# Patient Record
Sex: MALE | Race: WHITE | Employment: FULL TIME | URBAN - NONMETROPOLITAN AREA
[De-identification: names, ages, dates, MRNs, and addresses within clinical notes are randomized per-mention and may not be internally consistent; named-entity substitution may affect disease eponyms.]

---

## 2017-04-12 ENCOUNTER — OFFICE VISIT (OUTPATIENT)
Age: 52
End: 2017-04-12

## 2017-04-12 VITALS
RESPIRATION RATE: 16 BRPM | OXYGEN SATURATION: 98 % | TEMPERATURE: 98.5 F | HEART RATE: 100 BPM | WEIGHT: 206 LBS | DIASTOLIC BLOOD PRESSURE: 70 MMHG | BODY MASS INDEX: 27.9 KG/M2 | SYSTOLIC BLOOD PRESSURE: 118 MMHG | HEIGHT: 72 IN

## 2017-04-12 DIAGNOSIS — K35.32 PERFORATED APPENDICITIS: Primary | ICD-10-CM

## 2017-04-12 PROCEDURE — 99213 OFFICE O/P EST LOW 20 MIN: CPT | Performed by: SURGERY

## 2017-04-12 PROCEDURE — G8419 CALC BMI OUT NRM PARAM NOF/U: HCPCS | Performed by: SURGERY

## 2017-04-12 PROCEDURE — 1111F DSCHRG MED/CURRENT MED MERGE: CPT | Performed by: SURGERY

## 2017-04-12 PROCEDURE — 3017F COLORECTAL CA SCREEN DOC REV: CPT | Performed by: SURGERY

## 2017-04-12 PROCEDURE — G8427 DOCREV CUR MEDS BY ELIG CLIN: HCPCS | Performed by: SURGERY

## 2017-04-12 PROCEDURE — 1036F TOBACCO NON-USER: CPT | Performed by: SURGERY

## 2017-04-12 ASSESSMENT — ENCOUNTER SYMPTOMS
ABDOMINAL PAIN: 0
COUGH: 0
CHEST TIGHTNESS: 0
FACIAL SWELLING: 0
WHEEZING: 0
VOMITING: 0
BLOOD IN STOOL: 0
CONSTIPATION: 0
RECTAL PAIN: 0
ANAL BLEEDING: 0
BACK PAIN: 0
RHINORRHEA: 0
SORE THROAT: 0
COLOR CHANGE: 0
EYE PAIN: 0
EYE REDNESS: 0
CHOKING: 0
NAUSEA: 0
STRIDOR: 0
SHORTNESS OF BREATH: 0
TROUBLE SWALLOWING: 0
APNEA: 0
SINUS PRESSURE: 0
VOICE CHANGE: 0
ABDOMINAL DISTENTION: 0
DIARRHEA: 1
PHOTOPHOBIA: 0
EYE ITCHING: 0
EYE DISCHARGE: 0

## 2017-04-26 ENCOUNTER — TELEPHONE (OUTPATIENT)
Age: 52
End: 2017-04-26

## 2017-04-26 ENCOUNTER — OFFICE VISIT (OUTPATIENT)
Age: 52
End: 2017-04-26

## 2017-04-26 VITALS
RESPIRATION RATE: 16 BRPM | HEART RATE: 84 BPM | BODY MASS INDEX: 28.31 KG/M2 | HEIGHT: 72 IN | TEMPERATURE: 98.6 F | DIASTOLIC BLOOD PRESSURE: 76 MMHG | SYSTOLIC BLOOD PRESSURE: 130 MMHG | WEIGHT: 209 LBS

## 2017-04-26 DIAGNOSIS — K35.32 PERFORATED APPENDICITIS: Primary | ICD-10-CM

## 2017-04-26 PROCEDURE — 3017F COLORECTAL CA SCREEN DOC REV: CPT | Performed by: SURGERY

## 2017-04-26 PROCEDURE — G8419 CALC BMI OUT NRM PARAM NOF/U: HCPCS | Performed by: SURGERY

## 2017-04-26 PROCEDURE — 1111F DSCHRG MED/CURRENT MED MERGE: CPT | Performed by: SURGERY

## 2017-04-26 PROCEDURE — 99213 OFFICE O/P EST LOW 20 MIN: CPT | Performed by: SURGERY

## 2017-04-26 PROCEDURE — 1036F TOBACCO NON-USER: CPT | Performed by: SURGERY

## 2017-04-26 PROCEDURE — G8427 DOCREV CUR MEDS BY ELIG CLIN: HCPCS | Performed by: SURGERY

## 2017-04-26 ASSESSMENT — ENCOUNTER SYMPTOMS
TROUBLE SWALLOWING: 0
EYE PAIN: 0
EYE DISCHARGE: 0
EYE REDNESS: 0
NAUSEA: 0
VOICE CHANGE: 0
ABDOMINAL DISTENTION: 0
BACK PAIN: 0
SHORTNESS OF BREATH: 0
CHOKING: 0
ANAL BLEEDING: 0
EYE ITCHING: 0
STRIDOR: 0
ABDOMINAL PAIN: 0
VOMITING: 0
CONSTIPATION: 0
CHEST TIGHTNESS: 0
BLOOD IN STOOL: 0
COUGH: 0
SINUS PRESSURE: 0
SORE THROAT: 0
APNEA: 0
PHOTOPHOBIA: 0
FACIAL SWELLING: 0
COLOR CHANGE: 0
RECTAL PAIN: 0
DIARRHEA: 0
WHEEZING: 0
RHINORRHEA: 0

## 2017-05-03 ENCOUNTER — TELEPHONE (OUTPATIENT)
Age: 52
End: 2017-05-03

## 2017-05-23 ENCOUNTER — TELEPHONE (OUTPATIENT)
Age: 52
End: 2017-05-23

## 2017-05-30 PROBLEM — R07.9 CHEST PAIN IN ADULT: Status: ACTIVE | Noted: 2017-05-30

## 2017-05-30 PROBLEM — R94.39 ABNORMAL NUCLEAR STRESS TEST: Status: ACTIVE | Noted: 2017-05-30

## 2017-06-05 ENCOUNTER — TELEPHONE (OUTPATIENT)
Age: 52
End: 2017-06-05

## 2017-06-14 ENCOUNTER — TELEPHONE (OUTPATIENT)
Age: 52
End: 2017-06-14

## 2017-06-28 ENCOUNTER — OFFICE VISIT (OUTPATIENT)
Age: 52
End: 2017-06-28

## 2017-06-28 VITALS
OXYGEN SATURATION: 98 % | WEIGHT: 208.9 LBS | SYSTOLIC BLOOD PRESSURE: 128 MMHG | DIASTOLIC BLOOD PRESSURE: 78 MMHG | RESPIRATION RATE: 18 BRPM | HEIGHT: 72 IN | HEART RATE: 98 BPM | TEMPERATURE: 98.2 F | BODY MASS INDEX: 28.3 KG/M2

## 2017-06-28 DIAGNOSIS — Z90.49 S/P LAPAROSCOPIC APPENDECTOMY: Primary | ICD-10-CM

## 2017-06-28 PROCEDURE — 99024 POSTOP FOLLOW-UP VISIT: CPT | Performed by: NURSE PRACTITIONER

## 2017-06-28 ASSESSMENT — ENCOUNTER SYMPTOMS
SORE THROAT: 0
RHINORRHEA: 0
ABDOMINAL DISTENTION: 0
CHEST TIGHTNESS: 0
WHEEZING: 0
NAUSEA: 0
COLOR CHANGE: 0
CHOKING: 0
SINUS PRESSURE: 0
DIARRHEA: 0
CONSTIPATION: 0
STRIDOR: 0
EYE REDNESS: 0
BLOOD IN STOOL: 0
PHOTOPHOBIA: 0
ANAL BLEEDING: 0
COUGH: 0
VOMITING: 0
EYE PAIN: 0
TROUBLE SWALLOWING: 0
RECTAL PAIN: 0
BACK PAIN: 0
FACIAL SWELLING: 0
APNEA: 0
EYE ITCHING: 0
EYE DISCHARGE: 0
VOICE CHANGE: 0
SHORTNESS OF BREATH: 0

## 2019-01-22 ENCOUNTER — HOSPITAL ENCOUNTER (OUTPATIENT)
Age: 54
Discharge: HOME | End: 2019-01-22
Payer: COMMERCIAL

## 2019-01-22 LAB
ALBUMIN SERPL-MCNC: 4.8 G/DL (ref 3.5–5.1)
ALP BLD-CCNC: 54 U/L (ref 38–126)
ALT SERPL-CCNC: 25 U/L (ref 11–66)
ANION GAP SERPL CALCULATED.3IONS-SCNC: 11 MEQ/L (ref 8–16)
AST SERPL-CCNC: 19 U/L (ref 5–40)
BILIRUB SERPL-MCNC: 0.4 MG/DL (ref 0.3–1.2)
BILIRUBIN DIRECT: < 0.2 MG/DL (ref 0–0.3)
BUN BLDV-MCNC: 20 MG/DL (ref 7–22)
CALCIUM SERPL-MCNC: 9.2 MG/DL (ref 8.5–10.5)
CHLORIDE BLD-SCNC: 104 MEQ/L (ref 98–111)
CHOLESTEROL, TOTAL: 157 MG/DL (ref 100–199)
CO2: 26 MEQ/L (ref 23–33)
CREAT SERPL-MCNC: 0.9 MG/DL (ref 0.4–1.2)
GFR SERPL CREATININE-BSD FRML MDRD: 88 ML/MIN/1.73M2
GLUCOSE BLD-MCNC: 99 MG/DL (ref 70–108)
HDLC SERPL-MCNC: 42 MG/DL
LDL CHOLESTEROL CALCULATED: 77 MG/DL
POTASSIUM SERPL-SCNC: 4 MEQ/L (ref 3.5–5.2)
SODIUM BLD-SCNC: 141 MEQ/L (ref 135–145)
TOTAL PROTEIN: 7.2 G/DL (ref 6.1–8)
TRIGL SERPL-MCNC: 191 MG/DL (ref 0–199)

## 2019-01-22 PROCEDURE — 80053 COMPREHEN METABOLIC PANEL: CPT

## 2019-01-22 PROCEDURE — 80061 LIPID PANEL: CPT

## 2019-01-22 PROCEDURE — 36415 COLL VENOUS BLD VENIPUNCTURE: CPT

## 2019-01-22 PROCEDURE — 82248 BILIRUBIN DIRECT: CPT

## 2020-09-21 ENCOUNTER — HOSPITAL ENCOUNTER (OUTPATIENT)
Age: 55
Discharge: HOME | End: 2020-09-21
Payer: COMMERCIAL

## 2020-09-21 LAB
ALBUMIN SERPL-MCNC: 4.4 G/DL (ref 3.5–5.1)
ALP BLD-CCNC: 45 U/L (ref 38–126)
ALT SERPL-CCNC: 20 U/L (ref 11–66)
ANION GAP SERPL CALCULATED.3IONS-SCNC: 7 MEQ/L (ref 8–16)
AST SERPL-CCNC: 18 U/L (ref 5–40)
BILIRUB SERPL-MCNC: 0.5 MG/DL (ref 0.3–1.2)
BILIRUBIN DIRECT: < 0.2 MG/DL (ref 0–0.3)
BUN BLDV-MCNC: 23 MG/DL (ref 7–22)
CALCIUM SERPL-MCNC: 9.2 MG/DL (ref 8.5–10.5)
CHLORIDE BLD-SCNC: 106 MEQ/L (ref 98–111)
CHOLESTEROL, TOTAL: 183 MG/DL (ref 100–199)
CO2: 25 MEQ/L (ref 23–33)
CREAT SERPL-MCNC: 1 MG/DL (ref 0.4–1.2)
GFR SERPL CREATININE-BSD FRML MDRD: 78 ML/MIN/1.73M2
GLUCOSE BLD-MCNC: 111 MG/DL (ref 70–108)
HDLC SERPL-MCNC: 37 MG/DL
LDL CHOLESTEROL CALCULATED: 106 MG/DL
POTASSIUM SERPL-SCNC: 4.4 MEQ/L (ref 3.5–5.2)
SODIUM BLD-SCNC: 138 MEQ/L (ref 135–145)
TOTAL PROTEIN: 6.9 G/DL (ref 6.1–8)
TRIGL SERPL-MCNC: 202 MG/DL (ref 0–199)

## 2020-09-21 PROCEDURE — 80053 COMPREHEN METABOLIC PANEL: CPT

## 2020-09-21 PROCEDURE — 82248 BILIRUBIN DIRECT: CPT

## 2020-09-21 PROCEDURE — 80061 LIPID PANEL: CPT

## 2020-09-21 PROCEDURE — 36415 COLL VENOUS BLD VENIPUNCTURE: CPT

## 2021-09-28 LAB
ALBUMIN SERPL-MCNC: 4.5 GM/DL (ref 3.5–5)
ALP BLD-CCNC: 38 IU/L (ref 41–137)
ALT SERPL-CCNC: 19 IU/L (ref 10–40)
ANION GAP SERPL CALCULATED.3IONS-SCNC: 6 MMOL/L (ref 4–12)
AST SERPL-CCNC: 15 IU/L (ref 15–41)
BILIRUB SERPL-MCNC: 0.5 MG/DL (ref 0.2–1)
BILIRUBIN DIRECT: 0.1 MG/DL (ref 0.1–0.2)
BUN BLDV-MCNC: 19 MG/DL (ref 7–20)
CALCIUM SERPL-MCNC: 8.8 MG/DL (ref 8.8–10.5)
CHLORIDE BLD-SCNC: 108 MEQ/L (ref 101–111)
CHOLESTEROL/HDL RELATIVE RISK: 3.8 (ref 4–5)
CHOLESTEROL: 180 MG/DL
CO2: 23 MEQ/L (ref 21–32)
CREAT SERPL-MCNC: 0.99 MG/DL (ref 0.6–1.3)
CREATININE CLEARANCE: >60
DIRECT-LDL / HDL RISK: 2.4
GLUCOSE: 80 MG/DL (ref 70–110)
HCT VFR BLD CALC: 43.9 % (ref 40–49)
HDLC SERPL-MCNC: 47 MG/DL
HEMOGLOBIN: 15 GM/DL (ref 13.5–16.5)
LDL CHOLESTEROL DIRECT: 116 MG/DL
MCH RBC QN AUTO: 30.5 PG (ref 27.5–33)
MCHC RBC AUTO-ENTMCNC: 34.2 GM/DL (ref 33–36)
MCV RBC AUTO: 89.2 CU MIC (ref 80–97)
PDW BLD-RTO: 13.2 % (ref 12–16)
PLATELET # BLD: 208 TH/CMM (ref 150–400)
POTASSIUM SERPL-SCNC: 4 MEQ/L (ref 3.6–5)
RBC # BLD: 4.92 MIL/CMM (ref 4.5–6)
SODIUM BLD-SCNC: 137 MEQ/L (ref 135–145)
TOTAL PROTEIN: 6.6 G/DL (ref 6.2–8)
TRIGL SERPL-MCNC: 157 MG/DL
VLDLC SERPL CALC-MCNC: 31 MG/DL
WBC # BLD: 8 TH/CMM (ref 4.4–10.5)

## 2022-09-28 ENCOUNTER — HOSPITAL ENCOUNTER (OUTPATIENT)
Age: 57
Discharge: HOME | End: 2022-09-28
Payer: COMMERCIAL

## 2022-09-28 LAB
ALBUMIN SERPL-MCNC: 4.5 G/DL (ref 3.5–5.1)
ALP BLD-CCNC: 50 U/L (ref 38–126)
ALT SERPL-CCNC: 20 U/L (ref 11–66)
ANION GAP SERPL CALCULATED.3IONS-SCNC: 13 MEQ/L (ref 8–16)
AST SERPL-CCNC: 19 U/L (ref 5–40)
BILIRUB SERPL-MCNC: 0.7 MG/DL (ref 0.3–1.2)
BILIRUBIN DIRECT: < 0.2 MG/DL (ref 0–0.3)
BUN BLDV-MCNC: 20 MG/DL (ref 7–22)
CALCIUM SERPL-MCNC: 9.4 MG/DL (ref 8.5–10.5)
CHLORIDE BLD-SCNC: 106 MEQ/L (ref 98–111)
CHOLESTEROL, TOTAL: 183 MG/DL (ref 100–199)
CO2: 19 MEQ/L (ref 23–33)
CREAT SERPL-MCNC: 0.9 MG/DL (ref 0.4–1.2)
ERYTHROCYTE [DISTWIDTH] IN BLOOD BY AUTOMATED COUNT: 12.8 % (ref 11.5–14.5)
ERYTHROCYTE [DISTWIDTH] IN BLOOD BY AUTOMATED COUNT: 41.1 FL (ref 35–45)
GFR SERPL CREATININE-BSD FRML MDRD: 87 ML/MIN/1.73M2
GLUCOSE BLD-MCNC: 102 MG/DL (ref 70–108)
HCT VFR BLD CALC: 46.8 % (ref 42–52)
HDLC SERPL-MCNC: 43 MG/DL
HEMOGLOBIN: 15.7 GM/DL (ref 14–18)
LDL CHOLESTEROL CALCULATED: 101 MG/DL
MCH RBC QN AUTO: 29.8 PG (ref 26–33)
MCHC RBC AUTO-ENTMCNC: 33.5 GM/DL (ref 32.2–35.5)
MCV RBC AUTO: 88.8 FL (ref 80–94)
PLATELET # BLD: 227 THOU/MM3 (ref 130–400)
PMV BLD AUTO: 9.9 FL (ref 9.4–12.4)
POTASSIUM SERPL-SCNC: 4.3 MEQ/L (ref 3.5–5.2)
RBC # BLD: 5.27 MILL/MM3 (ref 4.7–6.1)
SODIUM BLD-SCNC: 138 MEQ/L (ref 135–145)
TOTAL PROTEIN: 6.9 G/DL (ref 6.1–8)
TRIGL SERPL-MCNC: 195 MG/DL (ref 0–199)
WBC # BLD: 6.8 THOU/MM3 (ref 4.8–10.8)

## 2022-09-28 PROCEDURE — 82248 BILIRUBIN DIRECT: CPT

## 2022-09-28 PROCEDURE — 80053 COMPREHEN METABOLIC PANEL: CPT

## 2022-09-28 PROCEDURE — 80061 LIPID PANEL: CPT

## 2022-09-28 PROCEDURE — 36415 COLL VENOUS BLD VENIPUNCTURE: CPT

## 2022-09-28 PROCEDURE — 85027 COMPLETE CBC AUTOMATED: CPT

## 2022-09-30 ENCOUNTER — OFFICE VISIT (OUTPATIENT)
Dept: CARDIOLOGY CLINIC | Age: 57
End: 2022-09-30
Payer: COMMERCIAL

## 2022-09-30 VITALS
RESPIRATION RATE: 18 BRPM | DIASTOLIC BLOOD PRESSURE: 76 MMHG | SYSTOLIC BLOOD PRESSURE: 132 MMHG | HEART RATE: 64 BPM | WEIGHT: 201.4 LBS | BODY MASS INDEX: 27.28 KG/M2 | HEIGHT: 72 IN

## 2022-09-30 DIAGNOSIS — I10 HYPERTENSION, UNSPECIFIED TYPE: Primary | ICD-10-CM

## 2022-09-30 PROCEDURE — 93000 ELECTROCARDIOGRAM COMPLETE: CPT | Performed by: INTERNAL MEDICINE

## 2022-09-30 RX ORDER — SPIRONOLACTONE 25 MG/1
TABLET ORAL
COMMUNITY
Start: 2022-07-25 | End: 2022-10-11 | Stop reason: SDUPTHER

## 2022-09-30 RX ORDER — LOSARTAN POTASSIUM 100 MG/1
TABLET ORAL
COMMUNITY
Start: 2022-08-03 | End: 2022-10-11 | Stop reason: SDUPTHER

## 2022-10-11 RX ORDER — SPIRONOLACTONE 25 MG/1
TABLET ORAL
Qty: 90 TABLET | Refills: 3 | Status: SHIPPED | OUTPATIENT
Start: 2022-10-11

## 2022-10-11 RX ORDER — LOSARTAN POTASSIUM 100 MG/1
TABLET ORAL
Qty: 90 TABLET | Refills: 3 | Status: SHIPPED | OUTPATIENT
Start: 2022-10-11

## 2023-03-01 ENCOUNTER — OFFICE VISIT (OUTPATIENT)
Dept: CARDIOLOGY CLINIC | Age: 58
End: 2023-03-01
Payer: COMMERCIAL

## 2023-03-01 VITALS
DIASTOLIC BLOOD PRESSURE: 77 MMHG | WEIGHT: 203 LBS | RESPIRATION RATE: 18 BRPM | BODY MASS INDEX: 27.5 KG/M2 | HEART RATE: 72 BPM | HEIGHT: 72 IN | SYSTOLIC BLOOD PRESSURE: 139 MMHG

## 2023-03-01 DIAGNOSIS — E78.5 DYSLIPIDEMIA (HIGH LDL; LOW HDL): ICD-10-CM

## 2023-03-01 DIAGNOSIS — I10 HYPERTENSION, UNSPECIFIED TYPE: Primary | ICD-10-CM

## 2023-03-01 PROCEDURE — 3078F DIAST BP <80 MM HG: CPT | Performed by: INTERNAL MEDICINE

## 2023-03-01 PROCEDURE — 99213 OFFICE O/P EST LOW 20 MIN: CPT | Performed by: INTERNAL MEDICINE

## 2023-03-01 PROCEDURE — 93000 ELECTROCARDIOGRAM COMPLETE: CPT | Performed by: INTERNAL MEDICINE

## 2023-03-01 PROCEDURE — 3075F SYST BP GE 130 - 139MM HG: CPT | Performed by: INTERNAL MEDICINE

## 2023-03-01 PROCEDURE — G8484 FLU IMMUNIZE NO ADMIN: HCPCS | Performed by: INTERNAL MEDICINE

## 2023-03-01 PROCEDURE — G8427 DOCREV CUR MEDS BY ELIG CLIN: HCPCS | Performed by: INTERNAL MEDICINE

## 2023-03-01 PROCEDURE — 1036F TOBACCO NON-USER: CPT | Performed by: INTERNAL MEDICINE

## 2023-03-01 PROCEDURE — G8419 CALC BMI OUT NRM PARAM NOF/U: HCPCS | Performed by: INTERNAL MEDICINE

## 2023-03-01 PROCEDURE — 3017F COLORECTAL CA SCREEN DOC REV: CPT | Performed by: INTERNAL MEDICINE

## 2023-03-01 ASSESSMENT — ENCOUNTER SYMPTOMS
CHOKING: 0
CHEST TIGHTNESS: 0
COUGH: 0
NAUSEA: 0
VOICE CHANGE: 0
TROUBLE SWALLOWING: 0
STRIDOR: 0
VOMITING: 0
BLOOD IN STOOL: 0
ANAL BLEEDING: 0
APNEA: 0
COLOR CHANGE: 0
SHORTNESS OF BREATH: 0
ABDOMINAL PAIN: 0
ABDOMINAL DISTENTION: 0
WHEEZING: 0

## 2023-03-01 NOTE — PROGRESS NOTES
[address] [place] [address]  Dept: [facility]  Loc: [phone]     3/1/2023       Allen Duong is here today for   Chief Complaint   Patient presents with    Follow-up           Referring Physician:  No ref. provider found     Patient Active Problem List   Diagnosis    Intra-abdominal abscess ([address]    Perforated appendicitis    Chest pain in adult    Abnormal nuclear stress test    Appendicitis       Review of Systems   Constitutional:  Negative for activity change, appetite change, fatigue, fever and unexpected weight change. HENT:  Negative for congestion, trouble swallowing and voice change. Eyes:  Negative for visual disturbance. Respiratory:  Negative for apnea, cough, choking, chest tightness, shortness of breath, wheezing and stridor. Cardiovascular:  Negative for chest pain, palpitations and leg swelling. Gastrointestinal:  Negative for abdominal distention, abdominal pain, anal bleeding, blood in stool, nausea and vomiting. Endocrine: Negative for cold intolerance and heat intolerance. Genitourinary:  Negative for hematuria. Musculoskeletal:  Negative for arthralgias, gait problem, joint swelling and myalgias. Skin:  Negative for color change and rash. Allergic/Immunologic: Negative for environmental allergies and food allergies. Neurological:  Negative for dizziness, tremors, syncope, facial asymmetry, weakness, light-headedness, numbness and headaches. Hematological:  Does not bruise/bleed easily. Psychiatric/Behavioral:  Negative for agitation, behavioral problems and sleep disturbance.        Past Medical History:   Diagnosis Date    Appendicitis     Wears glasses        No Known Allergies    Current Outpatient Medications   Medication Sig Dispense Refill    losartan (COZAAR) 100 MG tablet take 1 tablet by mouth once daily 90 tablet 3    spironolactone (ALDACTONE) 25 MG tablet take 1 tablet by mouth once daily 90 tablet 3    Multiple Vitamins-Minerals (THERAPEUTIC MULTIVITAMIN-MINERALS) tablet Take 1 tablet by mouth nightly       No current facility-administered medications for this visit. Social History     Socioeconomic History    Marital status:      Spouse name: None    Number of children: None    Years of education: None    Highest education level: None   Tobacco Use    Smoking status: Never    Smokeless tobacco: Never   Substance and Sexual Activity    Alcohol use: Yes     Comment: socially - weekends    Drug use: No    Sexual activity: Yes     Partners: Female       Family History   Problem Relation Age of Onset    Arthritis Mother     Heart Disease Father        Blood pressure 139/77, pulse 72, resp. rate 18, height 6' (1.829 m), weight 203 lb (92.1 kg). Physical Exam:    General Appearance: alert and oriented to person, place and time, in no acute distress  Cardiovascular: normal rate, regular rhythm, normal S1 and S2, no murmurs, rubs, clicks, or gallops, distal pulses intact, no carotid bruits, no JVD  Pulmonary/Chest: clear to auscultation bilaterally- no wheezes, rales or rhonchi, normal air movement, no respiratory distress  Abdomen: soft, non-tender, non-distended, normal bowel sounds, no masses   Extremities: no cyanosis, clubbing or edema, pulse   Skin: warm and dry, no rash or erythema  Head: normocephalic and atraumatic  Eyes: pupils equal, round, and reactive to light  Neck: supple and non-tender without mass, no thyromegaly   Musculoskeletal: normal range of motion, no joint swelling, deformity or tenderness  Neurological: alert, oriented, normal speech, no focal findings or movement disorder noted    Lab Data:    Cardiac Enzymes:  No results for input(s): CKTOTAL, CKMB, CKMBINDEX, TROPONINI in the last 72 hours.     CBC:   Lab Results   Component Value Date/Time    WBC 6.8 09/28/2022 08:33 AM    RBC 5.27 09/28/2022 08:33 AM    HGB 15.7 09/28/2022 08:33 AM    HCT 46.8 09/28/2022 08:33 AM     09/28/2022 08:33 AM       CMP:    Lab Results   Component Value Date/Time     09/28/2022 08:33 AM    K 4.3 09/28/2022 08:33 AM     09/28/2022 08:33 AM    CO2 19 09/28/2022 08:33 AM    BUN 20 09/28/2022 08:33 AM    CREATININE 0.9 09/28/2022 08:33 AM    LABGLOM 87 09/28/2022 08:33 AM    GLUCOSE 102 09/28/2022 08:33 AM    GLUCOSE 80 09/27/2021 01:14 PM    CALCIUM 9.4 09/28/2022 08:33 AM       Hepatic Function Panel:    Lab Results   Component Value Date/Time    ALKPHOS 50 09/28/2022 08:33 AM    ALT 20 09/28/2022 08:33 AM    AST 19 09/28/2022 08:33 AM    PROT 6.9 09/28/2022 08:33 AM    BILITOT 0.7 09/28/2022 08:33 AM    BILIDIR <0.2 09/28/2022 08:33 AM    LABALBU 4.5 09/28/2022 08:33 AM       Magnesium:    Lab Results   Component Value Date/Time    MG 2.0 04/03/2017 12:49 AM       PT/INR:  No results found for: PROTIME, INR    HgBA1c:  No results found for: LABA1C    FLP:    Lab Results   Component Value Date/Time    TRIG 195 09/28/2022 08:33 AM    HDL 43 09/28/2022 08:33 AM    LDLCALC 101 09/28/2022 08:33 AM    LDLDIRECT 116 09/27/2021 01:14 PM       TSH:  No results found for: TSH     Diagnosis Orders   1. Hypertension, unspecified type  91982 - [place] ELECTROCARDIOGRAM, COMPLETE    CBC    Basic Metabolic Panel    Lipid Panel    Hepatic Function Panel      2. Dyslipidemia (high LDL; low HDL)  CBC    Basic Metabolic Panel    Lipid Panel    Hepatic Function Panel           Assessment/Plan    Colin Swan is a 62years old and gentleman strong family history of premature coronary artery disease history of hypertension and hypercholesterolemia he is here for a follow-up. He denied chest pain he denies shortness of breath his EKG showed sinus rhythm LVH with strain pattern no change. He has no recent lab work. The patient blood pressure has been under better control.   The patient was given the option to follow-up with family physician but he wants to come here every year. He will his medication were reconciled and sent to his pharmacy he will be seen in a year with the lab work he was advised about diet exercise risk factor medication seek medical attention for any change in clinical condition thank    Orders Placed This Encounter   Procedures    CBC     Standing Status:   Future     Standing Expiration Date:   5/8/7285    Basic Metabolic Panel     Standing Status:   Future     Standing Expiration Date:   3/1/2024    Lipid Panel     Standing Status:   Future     Standing Expiration Date:   3/1/2024     Order Specific Question:   Is Patient Fasting?/# of Hours     Answer:   12 hours    Hepatic Function Panel     Standing Status:   Future     Standing Expiration Date:   3/1/2024    [address]82 - [place] ELECTROCARDIOGRAM, COMPLETE       Return in about 1 year (around 3/1/2024).      Michael Bailey MD

## 2023-10-24 RX ORDER — SPIRONOLACTONE 25 MG/1
TABLET ORAL
Qty: 90 TABLET | Refills: 1 | Status: SHIPPED | OUTPATIENT
Start: 2023-10-24

## 2023-10-24 RX ORDER — LOSARTAN POTASSIUM 100 MG/1
TABLET ORAL
Qty: 90 TABLET | Refills: 1 | Status: SHIPPED | OUTPATIENT
Start: 2023-10-24

## 2024-01-08 ENCOUNTER — TELEPHONE (OUTPATIENT)
Dept: CARDIOLOGY CLINIC | Age: 59
End: 2024-01-08

## 2024-01-08 NOTE — TELEPHONE ENCOUNTER
Patient calling in to establish with Dr Mai for cardiology, He had been seeing Dr Murphy.  He was due to be seen again March 2024 and will need refills.  Please call him back to discuss scheduling.

## 2024-03-01 ENCOUNTER — OFFICE VISIT (OUTPATIENT)
Dept: CARDIOLOGY CLINIC | Age: 59
End: 2024-03-01
Payer: COMMERCIAL

## 2024-03-01 VITALS
HEART RATE: 64 BPM | WEIGHT: 208 LBS | SYSTOLIC BLOOD PRESSURE: 136 MMHG | HEIGHT: 72 IN | BODY MASS INDEX: 28.17 KG/M2 | DIASTOLIC BLOOD PRESSURE: 76 MMHG

## 2024-03-01 DIAGNOSIS — I10 HYPERTENSION, UNSPECIFIED TYPE: ICD-10-CM

## 2024-03-01 DIAGNOSIS — R94.31 ABNORMAL EKG: Primary | ICD-10-CM

## 2024-03-01 PROCEDURE — G8484 FLU IMMUNIZE NO ADMIN: HCPCS | Performed by: INTERNAL MEDICINE

## 2024-03-01 PROCEDURE — 3075F SYST BP GE 130 - 139MM HG: CPT | Performed by: INTERNAL MEDICINE

## 2024-03-01 PROCEDURE — 99204 OFFICE O/P NEW MOD 45 MIN: CPT | Performed by: INTERNAL MEDICINE

## 2024-03-01 PROCEDURE — 3078F DIAST BP <80 MM HG: CPT | Performed by: INTERNAL MEDICINE

## 2024-03-01 PROCEDURE — 1036F TOBACCO NON-USER: CPT | Performed by: INTERNAL MEDICINE

## 2024-03-01 PROCEDURE — 3017F COLORECTAL CA SCREEN DOC REV: CPT | Performed by: INTERNAL MEDICINE

## 2024-03-01 PROCEDURE — G8427 DOCREV CUR MEDS BY ELIG CLIN: HCPCS | Performed by: INTERNAL MEDICINE

## 2024-03-01 PROCEDURE — 93000 ELECTROCARDIOGRAM COMPLETE: CPT | Performed by: INTERNAL MEDICINE

## 2024-03-01 PROCEDURE — G8419 CALC BMI OUT NRM PARAM NOF/U: HCPCS | Performed by: INTERNAL MEDICINE

## 2024-03-01 RX ORDER — LOSARTAN POTASSIUM 100 MG/1
100 TABLET ORAL DAILY
Qty: 90 TABLET | Refills: 3 | Status: SHIPPED | OUTPATIENT
Start: 2024-03-01

## 2024-03-01 RX ORDER — SPIRONOLACTONE 25 MG/1
25 TABLET ORAL DAILY
Qty: 90 TABLET | Refills: 3 | Status: SHIPPED | OUTPATIENT
Start: 2024-03-01

## 2024-03-01 NOTE — PATIENT INSTRUCTIONS
Your  Nurses  Today:  Ryley    Your Provider for Today: Dr. Mai       You may receive a survey regarding the care you received during your visit.  Your input is valuable to us.  We encourage you to complete and return your survey.  We hope you will choose us in the future for your healthcare needs.

## 2024-03-01 NOTE — PROGRESS NOTES
[facility] [place] [facility]  [facility] CARDIOLOGY  [address]  [address]  [facility] [address]  Dept: [phone]  Dept Fax: [phone]  Loc: [phone]    Visit Date: 3/1/2024    Mr. Isaacs is a 58 y.o. male  who presented for:  No chief complaint on file.      HPI:   57 yo M with HTN, HLD is here to establish cardiology. He used to follow up with Dr. Murphy.  He was seeing him for some arrhythmias.  He has abnormal EKG with deep TWI in anterolateral leads.  States he has been under stress lately.  Prior EKG in 2017 also had some TWI but not this extensive.          Current Outpatient Medications:     spironolactone (ALDACTONE) 25 MG tablet, take 1 tablet by mouth once daily, Disp: 90 tablet, Rfl: 1    losartan (COZAAR) 100 MG tablet, take 1 tablet by mouth once daily, Disp: 90 tablet, Rfl: 1    Multiple Vitamins-Minerals (THERAPEUTIC MULTIVITAMIN-MINERALS) tablet, Take 1 tablet by mouth nightly, Disp: , Rfl:     Past Medical History  Dav  has a past medical history of Appendicitis and Wears glasses.    Social History  Dav  reports that he has never smoked. He has never used smokeless tobacco. He reports current alcohol use. He reports that he does not use drugs.    Family History  Dav family history includes Arthritis in his mother; Heart Disease in his father.    Past Surgical History   Past Surgical History:   Procedure Laterality Date    APPENDECTOMY  06/13/2017    Robot Assisted    CARDIAC CATHETERIZATION      DENTAL SURGERY  1980's & 1990's    cystectomy from under tooth x2       Subjective:     REVIEW OF SYSTEMS  Constitutional: denies sweats, chills and fever  HENT: denies  congestion, sinus pressure, sneezing and sore throat.    Eyes: denies  pain, discharge, redness and itching.   Respiratory: denies apnea, cough  Gastrointestinal: denies blood in stool, constipation, diarrhea   Endocrine: denies cold intolerance, heat intolerance, polydipsia.  Genitourinary:

## 2024-03-08 ENCOUNTER — HOSPITAL ENCOUNTER (OUTPATIENT)
Age: 59
Discharge: HOME | End: 2024-03-08
Attending: INTERNAL MEDICINE
Payer: COMMERCIAL

## 2024-03-08 VITALS
SYSTOLIC BLOOD PRESSURE: 136 MMHG | HEIGHT: 72 IN | BODY MASS INDEX: 28.17 KG/M2 | WEIGHT: 208 LBS | DIASTOLIC BLOOD PRESSURE: 76 MMHG

## 2024-03-08 DIAGNOSIS — I10 HYPERTENSION, UNSPECIFIED TYPE: ICD-10-CM

## 2024-03-08 DIAGNOSIS — R94.31 ABNORMAL EKG: ICD-10-CM

## 2024-03-08 LAB
ECHO AV CUSP MM: 2.5 CM
ECHO AV PEAK GRADIENT: 5 MMHG
ECHO AV PEAK VELOCITY: 1.1 M/S
ECHO AV VELOCITY RATIO: 0.82
ECHO BSA: 2.19 M2
ECHO LA AREA 2C: 17.9 CM2
ECHO LA AREA 4C: 17.2 CM2
ECHO LA DIAMETER INDEX: 1.75 CM/M2
ECHO LA DIAMETER: 3.8 CM
ECHO LA MAJOR AXIS: 5 CM
ECHO LA MINOR AXIS: 5.3 CM
ECHO LA VOL BP: 48 ML (ref 18–58)
ECHO LA VOL MOD A2C: 49 ML (ref 18–58)
ECHO LA VOL MOD A4C: 46 ML (ref 18–58)
ECHO LA VOL/BSA BIPLANE: 22 ML/M2 (ref 16–34)
ECHO LA VOLUME INDEX MOD A2C: 23 ML/M2 (ref 16–34)
ECHO LA VOLUME INDEX MOD A4C: 21 ML/M2 (ref 16–34)
ECHO LV E' LATERAL VELOCITY: 8 CM/S
ECHO LV E' SEPTAL VELOCITY: 7 CM/S
ECHO LV FRACTIONAL SHORTENING: 32 % (ref 28–44)
ECHO LV INTERNAL DIMENSION DIASTOLE INDEX: 2.44 CM/M2
ECHO LV INTERNAL DIMENSION DIASTOLIC: 5.3 CM (ref 4.2–5.9)
ECHO LV INTERNAL DIMENSION SYSTOLIC INDEX: 1.66 CM/M2
ECHO LV INTERNAL DIMENSION SYSTOLIC: 3.6 CM
ECHO LV ISOVOLUMETRIC RELAXATION TIME (IVRT): 97 MS
ECHO LV IVSD: 1 CM (ref 0.6–1)
ECHO LV MASS 2D: 200.4 G (ref 88–224)
ECHO LV MASS INDEX 2D: 92.3 G/M2 (ref 49–115)
ECHO LV POSTERIOR WALL DIASTOLIC: 1 CM (ref 0.6–1)
ECHO LV RELATIVE WALL THICKNESS RATIO: 0.38
ECHO LVOT PEAK GRADIENT: 3 MMHG
ECHO LVOT PEAK VELOCITY: 0.9 M/S
ECHO MV A VELOCITY: 0.48 M/S
ECHO MV E DECELERATION TIME (DT): 220 MS
ECHO MV E VELOCITY: 0.6 M/S
ECHO MV E/A RATIO: 1.25
ECHO MV E/E' LATERAL: 7.5
ECHO MV E/E' RATIO (AVERAGED): 8.04
ECHO PV MAX VELOCITY: 0.8 M/S
ECHO PV PEAK GRADIENT: 3 MMHG
ECHO RV INTERNAL DIMENSION: 2.8 CM
ECHO RV TAPSE: 2.3 CM (ref 1.7–?)
ECHO TV E WAVE: 0.4 M/S

## 2024-03-08 PROCEDURE — 93306 TTE W/DOPPLER COMPLETE: CPT

## 2024-03-08 PROCEDURE — 93306 TTE W/DOPPLER COMPLETE: CPT | Performed by: INTERNAL MEDICINE

## 2024-11-06 RX ORDER — LOSARTAN POTASSIUM 100 MG/1
100 TABLET ORAL DAILY
Qty: 90 TABLET | Refills: 1 | Status: SHIPPED | OUTPATIENT
Start: 2024-11-06

## 2024-11-06 RX ORDER — SPIRONOLACTONE 25 MG/1
25 TABLET ORAL DAILY
Qty: 90 TABLET | Refills: 1 | Status: SHIPPED | OUTPATIENT
Start: 2024-11-06

## 2024-11-06 NOTE — TELEPHONE ENCOUNTER
Dav Isaacs called requesting a refill on the following medications:  Requested Prescriptions     Pending Prescriptions Disp Refills    losartan (COZAAR) 100 MG tablet 90 tablet 3     Sig: Take 1 tablet by mouth daily    spironolactone (ALDACTONE) 25 MG tablet 90 tablet 3     Sig: Take 1 tablet by mouth daily     Pharmacy verified:    [facility]/pharmacy #[id number] - [place], [place] - [address] [phone] - F [phone]     Date of last visit: 03/01/2024  Date of next visit (if applicable): 3/7/2025

## 2025-03-06 NOTE — PATIENT INSTRUCTIONS
Your Provider for Today: Dr. Mai  Your nurses for today: Riya    You may receive a survey regarding the care you received during your visit.  Your input is valuable to us.  We encourage you to complete and return your survey.  We hope you will choose us in the future for your healthcare needs.

## 2025-03-07 ENCOUNTER — OFFICE VISIT (OUTPATIENT)
Dept: CARDIOLOGY CLINIC | Age: 60
End: 2025-03-07
Payer: COMMERCIAL

## 2025-03-07 VITALS
BODY MASS INDEX: 28.44 KG/M2 | WEIGHT: 210 LBS | SYSTOLIC BLOOD PRESSURE: 134 MMHG | DIASTOLIC BLOOD PRESSURE: 80 MMHG | HEART RATE: 71 BPM | HEIGHT: 72 IN

## 2025-03-07 DIAGNOSIS — R94.31 ABNORMAL EKG: ICD-10-CM

## 2025-03-07 DIAGNOSIS — I10 HYPERTENSION, UNSPECIFIED TYPE: Primary | ICD-10-CM

## 2025-03-07 PROCEDURE — 93000 ELECTROCARDIOGRAM COMPLETE: CPT | Performed by: INTERNAL MEDICINE

## 2025-03-07 PROCEDURE — 3017F COLORECTAL CA SCREEN DOC REV: CPT | Performed by: INTERNAL MEDICINE

## 2025-03-07 PROCEDURE — 4004F PT TOBACCO SCREEN RCVD TLK: CPT | Performed by: INTERNAL MEDICINE

## 2025-03-07 PROCEDURE — G8419 CALC BMI OUT NRM PARAM NOF/U: HCPCS | Performed by: INTERNAL MEDICINE

## 2025-03-07 PROCEDURE — 3079F DIAST BP 80-89 MM HG: CPT | Performed by: INTERNAL MEDICINE

## 2025-03-07 PROCEDURE — 99214 OFFICE O/P EST MOD 30 MIN: CPT | Performed by: INTERNAL MEDICINE

## 2025-03-07 PROCEDURE — G8427 DOCREV CUR MEDS BY ELIG CLIN: HCPCS | Performed by: INTERNAL MEDICINE

## 2025-03-07 PROCEDURE — 3075F SYST BP GE 130 - 139MM HG: CPT | Performed by: INTERNAL MEDICINE

## 2025-03-07 RX ORDER — LOSARTAN POTASSIUM 100 MG/1
100 TABLET ORAL DAILY
Qty: 90 TABLET | Refills: 3 | Status: SHIPPED | OUTPATIENT
Start: 2025-03-07

## 2025-03-07 RX ORDER — SPIRONOLACTONE 25 MG/1
25 TABLET ORAL DAILY
Qty: 90 TABLET | Refills: 3 | Status: SHIPPED | OUTPATIENT
Start: 2025-03-07

## 2025-03-07 NOTE — PROGRESS NOTES
itching.   Respiratory: denies apnea, cough  Gastrointestinal: denies blood in stool, constipation, diarrhea   Endocrine: denies cold intolerance, heat intolerance, polydipsia.  Genitourinary: denies dysuria, enuresis, flank pain and hematuria.   Musculoskeletal: denies arthralgias, joint swelling and neck pain.   Neurological: denies numbness and headaches.   Psychiatric/Behavioral: denies agitation, confusion, decreased concentration and dysphoric mood    All others reviewed and are negative.   Objective:     /80   Pulse 71   Ht 1.829 m (6')   Wt 95.3 kg (210 lb)   BMI 28.48 kg/m²     Wt Readings from Last 3 Encounters:   03/07/25 95.3 kg (210 lb)   03/08/24 94.3 kg (208 lb)   03/01/24 94.3 kg (208 lb)     BP Readings from Last 3 Encounters:   03/07/25 134/80   03/08/24 136/76   03/01/24 136/76       PHYSICAL EXAM  Constitutional: Oriented to person, place, and time. Appears well-developed and well-nourished.   HENT:   Head: Normocephalic and atraumatic.   Eyes: EOM are normal. Pupils are equal, round, and reactive to light.   Neck: Normal range of motion. Neck supple. No JVD present.   Cardiovascular: Normal rate , normal heart sounds and intact distal pulses.    Pulmonary/Chest: Effort normal and breath sounds normal. No respiratory distress. No wheezes. No rales.   Abdominal: Soft. Bowel sounds are normal. No distension. There is no tenderness.   Musculoskeletal: Normal range of motion. No edema.   Neurological: Alert and oriented to person, place, and time. No cranial nerve deficit. Coordination normal.   Skin: Skin is warm and dry.   Psychiatric: Normal mood and affect.       No results found for: \"CKTOTAL\", \"CKMB\", \"CKMBINDEX\"    Lab Results   Component Value Date/Time    WBC 6.8 09/28/2022 08:33 AM    RBC 5.27 09/28/2022 08:33 AM    HGB 15.7 09/28/2022 08:33 AM    HCT 46.8 09/28/2022 08:33 AM    MCV 88.8 09/28/2022 08:33 AM    MCH 29.8 09/28/2022 08:33 AM    MCHC 33.5 09/28/2022 08:33 AM    RDW